# Patient Record
Sex: MALE | Race: WHITE | NOT HISPANIC OR LATINO | Employment: FULL TIME | URBAN - METROPOLITAN AREA
[De-identification: names, ages, dates, MRNs, and addresses within clinical notes are randomized per-mention and may not be internally consistent; named-entity substitution may affect disease eponyms.]

---

## 2025-01-28 ENCOUNTER — APPOINTMENT (OUTPATIENT)
Dept: RADIOLOGY | Facility: HOSPITAL | Age: 60
End: 2025-01-28
Payer: COMMERCIAL

## 2025-01-28 ENCOUNTER — APPOINTMENT (OUTPATIENT)
Dept: CARDIOLOGY | Facility: HOSPITAL | Age: 60
End: 2025-01-28
Payer: COMMERCIAL

## 2025-01-28 ENCOUNTER — HOSPITAL ENCOUNTER (EMERGENCY)
Facility: HOSPITAL | Age: 60
Discharge: HOME | End: 2025-01-28
Payer: COMMERCIAL

## 2025-01-28 ENCOUNTER — OFFICE VISIT (OUTPATIENT)
Dept: URGENT CARE | Age: 60
End: 2025-01-28
Payer: COMMERCIAL

## 2025-01-28 VITALS
WEIGHT: 189.38 LBS | TEMPERATURE: 99 F | HEART RATE: 98 BPM | DIASTOLIC BLOOD PRESSURE: 101 MMHG | BODY MASS INDEX: 29.72 KG/M2 | SYSTOLIC BLOOD PRESSURE: 180 MMHG | OXYGEN SATURATION: 93 % | RESPIRATION RATE: 20 BRPM | HEIGHT: 67 IN

## 2025-01-28 VITALS
BODY MASS INDEX: 28.88 KG/M2 | SYSTOLIC BLOOD PRESSURE: 173 MMHG | OXYGEN SATURATION: 98 % | HEIGHT: 67 IN | WEIGHT: 184 LBS | RESPIRATION RATE: 20 BRPM | DIASTOLIC BLOOD PRESSURE: 97 MMHG | HEART RATE: 82 BPM | TEMPERATURE: 98.8 F

## 2025-01-28 DIAGNOSIS — R10.84 GENERALIZED ABDOMINAL PAIN: Primary | ICD-10-CM

## 2025-01-28 DIAGNOSIS — R10.32 LEFT GROIN PAIN: Primary | ICD-10-CM

## 2025-01-28 LAB
ALBUMIN SERPL BCP-MCNC: 4.8 G/DL (ref 3.4–5)
ALP SERPL-CCNC: 58 U/L (ref 33–120)
ALT SERPL W P-5'-P-CCNC: 26 U/L (ref 10–52)
ANION GAP SERPL CALCULATED.3IONS-SCNC: 13 MMOL/L (ref 10–20)
APPEARANCE UR: CLEAR
AST SERPL W P-5'-P-CCNC: 20 U/L (ref 9–39)
ATRIAL RATE: 117 BPM
BASOPHILS # BLD AUTO: 0.05 X10*3/UL (ref 0–0.1)
BASOPHILS NFR BLD AUTO: 0.5 %
BILIRUB SERPL-MCNC: 0.6 MG/DL (ref 0–1.2)
BILIRUB UR STRIP.AUTO-MCNC: NEGATIVE MG/DL
BUN SERPL-MCNC: 18 MG/DL (ref 6–23)
CALCIUM SERPL-MCNC: 10.2 MG/DL (ref 8.6–10.3)
CARDIAC TROPONIN I PNL SERPL HS: 10 NG/L (ref 0–20)
CARDIAC TROPONIN I PNL SERPL HS: 8 NG/L (ref 0–20)
CHLORIDE SERPL-SCNC: 98 MMOL/L (ref 98–107)
CO2 SERPL-SCNC: 28 MMOL/L (ref 21–32)
COLOR UR: ABNORMAL
CREAT SERPL-MCNC: 0.88 MG/DL (ref 0.5–1.3)
EGFRCR SERPLBLD CKD-EPI 2021: >90 ML/MIN/1.73M*2
EOSINOPHIL # BLD AUTO: 0.13 X10*3/UL (ref 0–0.7)
EOSINOPHIL NFR BLD AUTO: 1.4 %
ERYTHROCYTE [DISTWIDTH] IN BLOOD BY AUTOMATED COUNT: 12.8 % (ref 11.5–14.5)
GLUCOSE SERPL-MCNC: 248 MG/DL (ref 74–99)
GLUCOSE UR STRIP.AUTO-MCNC: ABNORMAL MG/DL
HCT VFR BLD AUTO: 45 % (ref 41–52)
HGB BLD-MCNC: 15.5 G/DL (ref 13.5–17.5)
IMM GRANULOCYTES # BLD AUTO: 0.02 X10*3/UL (ref 0–0.7)
IMM GRANULOCYTES NFR BLD AUTO: 0.2 % (ref 0–0.9)
KETONES UR STRIP.AUTO-MCNC: ABNORMAL MG/DL
LACTATE SERPL-SCNC: 1 MMOL/L (ref 0.4–2)
LEUKOCYTE ESTERASE UR QL STRIP.AUTO: NEGATIVE
LIPASE SERPL-CCNC: 17 U/L (ref 9–82)
LYMPHOCYTES # BLD AUTO: 1.91 X10*3/UL (ref 1.2–4.8)
LYMPHOCYTES NFR BLD AUTO: 20.9 %
MAGNESIUM SERPL-MCNC: 2.11 MG/DL (ref 1.6–2.4)
MCH RBC QN AUTO: 32.2 PG (ref 26–34)
MCHC RBC AUTO-ENTMCNC: 34.4 G/DL (ref 32–36)
MCV RBC AUTO: 94 FL (ref 80–100)
MONOCYTES # BLD AUTO: 0.57 X10*3/UL (ref 0.1–1)
MONOCYTES NFR BLD AUTO: 6.3 %
NEUTROPHILS # BLD AUTO: 6.44 X10*3/UL (ref 1.2–7.7)
NEUTROPHILS NFR BLD AUTO: 70.7 %
NITRITE UR QL STRIP.AUTO: NEGATIVE
NRBC BLD-RTO: 0 /100 WBCS (ref 0–0)
P AXIS: 58 DEGREES
P OFFSET: 184 MS
P ONSET: 135 MS
PH UR STRIP.AUTO: 7 [PH]
PLATELET # BLD AUTO: 244 X10*3/UL (ref 150–450)
POTASSIUM SERPL-SCNC: 4.5 MMOL/L (ref 3.5–5.3)
PR INTERVAL: 156 MS
PROT SERPL-MCNC: 7.8 G/DL (ref 6.4–8.2)
PROT UR STRIP.AUTO-MCNC: ABNORMAL MG/DL
Q ONSET: 213 MS
QRS COUNT: 19 BEATS
QRS DURATION: 92 MS
QT INTERVAL: 318 MS
QTC CALCULATION(BAZETT): 443 MS
QTC FREDERICIA: 397 MS
R AXIS: 31 DEGREES
RBC # BLD AUTO: 4.81 X10*6/UL (ref 4.5–5.9)
RBC # UR STRIP.AUTO: NEGATIVE /UL
RBC #/AREA URNS AUTO: NORMAL /HPF
SODIUM SERPL-SCNC: 134 MMOL/L (ref 136–145)
SP GR UR STRIP.AUTO: 1.03
T AXIS: 35 DEGREES
T OFFSET: 372 MS
UROBILINOGEN UR STRIP.AUTO-MCNC: NORMAL MG/DL
VENTRICULAR RATE: 117 BPM
WBC # BLD AUTO: 9.1 X10*3/UL (ref 4.4–11.3)
WBC #/AREA URNS AUTO: NORMAL /HPF

## 2025-01-28 PROCEDURE — 74177 CT ABD & PELVIS W/CONTRAST: CPT

## 2025-01-28 PROCEDURE — 85025 COMPLETE CBC W/AUTO DIFF WBC: CPT | Performed by: PHYSICIAN ASSISTANT

## 2025-01-28 PROCEDURE — 71045 X-RAY EXAM CHEST 1 VIEW: CPT

## 2025-01-28 PROCEDURE — 80053 COMPREHEN METABOLIC PANEL: CPT | Performed by: PHYSICIAN ASSISTANT

## 2025-01-28 PROCEDURE — 36415 COLL VENOUS BLD VENIPUNCTURE: CPT | Performed by: PHYSICIAN ASSISTANT

## 2025-01-28 PROCEDURE — 83690 ASSAY OF LIPASE: CPT | Performed by: PHYSICIAN ASSISTANT

## 2025-01-28 PROCEDURE — 84484 ASSAY OF TROPONIN QUANT: CPT | Performed by: PHYSICIAN ASSISTANT

## 2025-01-28 PROCEDURE — 99285 EMERGENCY DEPT VISIT HI MDM: CPT | Mod: 25

## 2025-01-28 PROCEDURE — 96374 THER/PROPH/DIAG INJ IV PUSH: CPT

## 2025-01-28 PROCEDURE — 83735 ASSAY OF MAGNESIUM: CPT | Performed by: PHYSICIAN ASSISTANT

## 2025-01-28 PROCEDURE — 76870 US EXAM SCROTUM: CPT | Mod: FOREIGN READ | Performed by: RADIOLOGY

## 2025-01-28 PROCEDURE — 93975 VASCULAR STUDY: CPT

## 2025-01-28 PROCEDURE — 96376 TX/PRO/DX INJ SAME DRUG ADON: CPT

## 2025-01-28 PROCEDURE — 93971 EXTREMITY STUDY: CPT

## 2025-01-28 PROCEDURE — 96361 HYDRATE IV INFUSION ADD-ON: CPT

## 2025-01-28 PROCEDURE — 71045 X-RAY EXAM CHEST 1 VIEW: CPT | Mod: FOREIGN READ | Performed by: RADIOLOGY

## 2025-01-28 PROCEDURE — 2550000001 HC RX 255 CONTRASTS: Performed by: PHYSICIAN ASSISTANT

## 2025-01-28 PROCEDURE — 93971 EXTREMITY STUDY: CPT | Mod: FOREIGN READ | Performed by: RADIOLOGY

## 2025-01-28 PROCEDURE — 96372 THER/PROPH/DIAG INJ SC/IM: CPT | Performed by: PHYSICIAN ASSISTANT

## 2025-01-28 PROCEDURE — 93976 VASCULAR STUDY: CPT | Mod: FOREIGN READ | Performed by: RADIOLOGY

## 2025-01-28 PROCEDURE — 93005 ELECTROCARDIOGRAM TRACING: CPT

## 2025-01-28 PROCEDURE — 83605 ASSAY OF LACTIC ACID: CPT | Performed by: PHYSICIAN ASSISTANT

## 2025-01-28 PROCEDURE — 96375 TX/PRO/DX INJ NEW DRUG ADDON: CPT

## 2025-01-28 PROCEDURE — 81001 URINALYSIS AUTO W/SCOPE: CPT | Performed by: PHYSICIAN ASSISTANT

## 2025-01-28 PROCEDURE — 74177 CT ABD & PELVIS W/CONTRAST: CPT | Mod: FOREIGN READ | Performed by: RADIOLOGY

## 2025-01-28 PROCEDURE — 2500000004 HC RX 250 GENERAL PHARMACY W/ HCPCS (ALT 636 FOR OP/ED): Performed by: PHYSICIAN ASSISTANT

## 2025-01-28 RX ORDER — MORPHINE SULFATE 4 MG/ML
4 INJECTION, SOLUTION INTRAMUSCULAR; INTRAVENOUS ONCE
Status: COMPLETED | OUTPATIENT
Start: 2025-01-28 | End: 2025-01-28

## 2025-01-28 RX ORDER — CYANOCOBALAMIN (VITAMIN B-12) 250 MCG
250 TABLET ORAL DAILY
COMMUNITY

## 2025-01-28 RX ORDER — ORPHENADRINE CITRATE 30 MG/ML
60 INJECTION INTRAMUSCULAR; INTRAVENOUS ONCE
Status: COMPLETED | OUTPATIENT
Start: 2025-01-28 | End: 2025-01-28

## 2025-01-28 RX ORDER — KETOROLAC TROMETHAMINE 30 MG/ML
15 INJECTION, SOLUTION INTRAMUSCULAR; INTRAVENOUS ONCE
Status: COMPLETED | OUTPATIENT
Start: 2025-01-28 | End: 2025-01-28

## 2025-01-28 RX ADMIN — SODIUM CHLORIDE 1000 ML: 900 INJECTION, SOLUTION INTRAVENOUS at 12:13

## 2025-01-28 RX ADMIN — ORPHENADRINE CITRATE 60 MG: 60 INJECTION INTRAMUSCULAR; INTRAVENOUS at 16:18

## 2025-01-28 RX ADMIN — MORPHINE SULFATE 4 MG: 4 INJECTION, SOLUTION INTRAMUSCULAR; INTRAVENOUS at 12:12

## 2025-01-28 RX ADMIN — KETOROLAC TROMETHAMINE 15 MG: 30 INJECTION, SOLUTION INTRAMUSCULAR at 12:12

## 2025-01-28 RX ADMIN — IOHEXOL 75 ML: 350 INJECTION, SOLUTION INTRAVENOUS at 13:21

## 2025-01-28 RX ADMIN — SODIUM CHLORIDE 1000 ML: 900 INJECTION, SOLUTION INTRAVENOUS at 12:12

## 2025-01-28 RX ADMIN — MORPHINE SULFATE 4 MG: 4 INJECTION, SOLUTION INTRAMUSCULAR; INTRAVENOUS at 16:18

## 2025-01-28 ASSESSMENT — PAIN SCALES - GENERAL
PAINLEVEL_OUTOF10: 8
PAINLEVEL_OUTOF10: 7
PAINLEVEL_OUTOF10: 7
PAINLEVEL_OUTOF10: 8
PAINLEVEL_OUTOF10: 2

## 2025-01-28 ASSESSMENT — COLUMBIA-SUICIDE SEVERITY RATING SCALE - C-SSRS
1. IN THE PAST MONTH, HAVE YOU WISHED YOU WERE DEAD OR WISHED YOU COULD GO TO SLEEP AND NOT WAKE UP?: NO
2. HAVE YOU ACTUALLY HAD ANY THOUGHTS OF KILLING YOURSELF?: NO
6. HAVE YOU EVER DONE ANYTHING, STARTED TO DO ANYTHING, OR PREPARED TO DO ANYTHING TO END YOUR LIFE?: NO

## 2025-01-28 ASSESSMENT — PAIN - FUNCTIONAL ASSESSMENT: PAIN_FUNCTIONAL_ASSESSMENT: 0-10

## 2025-01-28 ASSESSMENT — PAIN DESCRIPTION - LOCATION: LOCATION: GROIN

## 2025-01-28 NOTE — PROGRESS NOTES
"Subjective   Patient ID: Js Kenny Sr. is a 60 y.o. male. They present today with a chief complaint of Groin Pain (Pain of the L Groin area x 1 d; Denies injury.).    History of Present Illness    Groin Pain    This is a very pleasant 60-year-old gentleman presents today complaining of sudden onset of left groin pain 12 hours ago that woke him up from sleep.  Patient states the pain has been persistent.  He denies any numbness or tingling to his lower extremities.  Denies any trauma.  He denies any heavy lifting.  Patient states the pain is fourth with ambulation.  Past Medical History  Allergies as of 01/28/2025 - Reviewed 01/28/2025   Allergen Reaction Noted    Penicillins Hives 09/18/2012       (Not in a hospital admission)       Past Medical History:   Diagnosis Date    Personal history of other diseases of the musculoskeletal system and connective tissue     History of arthritis    Personal history of other diseases of the musculoskeletal system and connective tissue     History of low back pain    Personal history of other endocrine, nutritional and metabolic disease     History of type 2 diabetes mellitus       History reviewed. No pertinent surgical history.     reports that he has been smoking cigarettes. He does not have any smokeless tobacco history on file. He reports current alcohol use.    Review of Systems  Review of Systems   All other systems reviewed and are negative.                                 Objective    Vitals:    01/28/25 1037   BP: (!) 173/97   BP Location: Left arm   Patient Position: Sitting   BP Cuff Size: Large adult   Pulse: 82   Resp: 20   Temp: 37.1 °C (98.8 °F)   TempSrc: Oral   SpO2: 98%   Weight: 83.5 kg (184 lb)   Height: 1.702 m (5' 7\")     No LMP for male patient.    Physical Exam  The patient is awake alert oriented x 3 in no acute distress.  Vital signs are stable.  Evaluation the left inguinal region reveals a pulses to be intact.  There was pinpoint tenderness to " the mid left inguinal region.  There was no bony tenderness.  Positive pain on palpation.  No ecchymosis or bruising noted.  Pain with straight leg raising.  Distal pulses are intact.  Procedures    Point of Care Test & Imaging Results from this visit  No results found for this visit on 01/28/25.   No results found.    Diagnostic study results (if any) were reviewed by Gamal Bañuelos DO.    Assessment/Plan   Allergies, medications, history, and pertinent labs/EKGs/Imaging reviewed by Gamal Bañuelos DO.     Medical Decision Making  The patient was advised to be seen in the emergency room to undergo ultrasound of his left groin region to rule out any vascular pathology.    Orders and Diagnoses  Diagnoses and all orders for this visit:  Left groin pain      Medical Admin Record      Patient disposition: ED    Electronically signed by Gamal Bañuelos DO  10:50 AM

## 2025-01-28 NOTE — ED PROVIDER NOTES
"Patient signed out to me by off going provider, Dr. Celio Helton, at 6:06 PM in stable condition.    IN BRIEF:  60 y.o.male // pmhx nothing pertinent// p/w left groin pain  -Workup unremarkable up to this point  -Imaging unremarkable for any significant pathology    BP (!) 174/97   Pulse 99   Temp 37.2 °C (99 °F)   Resp 18   Ht 1.702 m (5' 7\")   Wt 85.9 kg (189 lb 6 oz)   SpO2 96%   BMI 29.66 kg/m²     ED Course as of 01/28/25 1806 Tue Jan 28, 2025   1207 EKG interpreted by myself independently, EKG shows a sinus tachycardia, rate of 117 bpm, TX interval 156, QRS 92, , QTc 443, patient has no ST elevation or depression, negative for acute MI. [OMAR]      ED Course User Index  [OMAR] Celio Helton, DO         Diagnoses as of 01/28/25 1806   Generalized abdominal pain       Remaining work up:  Images US scrotum, Reassessment, and Discharge pending imaging    Likely disposition Discharge Home with alternative Urology consult for possible surgical intervention and admission.    Physical Exam  Vitals and nursing note reviewed.   Constitutional:       Appearance: Normal appearance. He is normal weight.   HENT:      Mouth/Throat:      Mouth: Mucous membranes are moist.   Eyes:      Pupils: Pupils are equal, round, and reactive to light.   Cardiovascular:      Rate and Rhythm: Normal rate and regular rhythm.      Pulses: Normal pulses.   Pulmonary:      Effort: Pulmonary effort is normal.      Breath sounds: Normal breath sounds.   Skin:     General: Skin is warm and dry.   Neurological:      General: No focal deficit present.      Mental Status: He is alert and oriented to person, place, and time.         TRANSITION of CARE INTERVAL:  Reviewed clinical workup without and concern for testicular torsion or epididymitis/orchitis.  Discussed clinical findings with patient.  On reassessment patient endorses minimal discomfort in left groin without any evidence of bulging concerning for indirect incarcerated/strangulated " hernia.  Provided patient appropriate follow-up instructions and RTED precautions.  Patient is appropriate for discharge home at this time and agreeable to plan of care.       FINAL IMPRESSION:  1. Generalized abdominal pain              FINAL DISPOSITION:  Discharge home       Jared Melvin MD  01/29/25 9268

## 2025-01-28 NOTE — ED PROVIDER NOTES
HPI   Chief Complaint   Patient presents with    Groin Pain     Pt states he was woken from his sleep with sharp left groin pain. Gets worse with movement.       HPI  Patient is a 60-year-old male here for evaluation of left groin pain.  Says that he woke up from sleep with sharp left groin pain, seems be worse with movement.  The patient denies any major health issues or medical problems, drinks and smokes daily.  But does not follow with a physician regularly.  Patient is having no chest pain, no shortness of breath.  And complaining of pain predominantly left groin.  No trouble urinating.      Patient History   Past Medical History:   Diagnosis Date    Personal history of other diseases of the musculoskeletal system and connective tissue     History of arthritis    Personal history of other diseases of the musculoskeletal system and connective tissue     History of low back pain    Personal history of other endocrine, nutritional and metabolic disease     History of type 2 diabetes mellitus     History reviewed. No pertinent surgical history.  No family history on file.  Social History     Tobacco Use    Smoking status: Every Day     Types: Cigarettes    Smokeless tobacco: Not on file   Substance Use Topics    Alcohol use: Yes    Drug use: Not on file       Physical Exam   ED Triage Vitals [01/28/25 1115]   Temperature Heart Rate Respirations BP   37.2 °C (99 °F) (!) 118 16 (!) 197/122      Pulse Ox Temp src Heart Rate Source Patient Position   97 % -- -- --      BP Location FiO2 (%)     -- --       Physical Exam      ED Course & [facility]   ED Course as of 01/28/25 1721 Tue Jan 28, 2025   1207 EKG interpreted by myself independently, EKG shows a sinus tachycardia, rate of 117 bpm, ID interval 156, QRS 92, , QTc 443, patient has no ST elevation or depression, negative for acute MI. [OMAR]      ED Course User Index  [OMAR] Celio Helton,          Diagnoses as of 01/28/25 1721   Generalized abdominal pain                  No data recorded     Avery Coma Scale Score: 15 (01/28/25 1125 : Maribel Banegas RN)                           Medical Decision Making  Parts of this chart have been completed using voice recognition software. Please excuse any errors of transcription.  My thought process and reason for plan has been formulated from the time that I saw the patient until the time of disposition and is not specific to one specific moment during their visit and furthermore my MDM encompasses this entire chart and not only this text box.      HPI: Detailed above.    Exam: A medically appropriate exam performed, outlined above, given the known history and presentation.    History Limited by: Nothing    History obtained from: The patient    External/internal records reviewed: No external record reviewed    Social Determinants of Health considered during this visit: Lives at    Chronic conditions impacting care: Denies    Medications given during visit:  Medications   sodium chloride 0.9 % bolus 1,000 mL (0 mL intravenous Stopped 1/28/25 1314)   ketorolac (Toradol) injection 15 mg (15 mg intravenous Given 1/28/25 1212)   morphine injection 4 mg (4 mg intravenous Given 1/28/25 1212)   sodium chloride 0.9 % bolus 1,000 mL (0 mL intravenous Stopped 1/28/25 1341)   iohexol (OMNIPaque) 350 mg iodine/mL solution 75 mL (75 mL intravenous Given 1/28/25 1321)   orphenadrine (Norflex) injection 60 mg (60 mg intramuscular Given 1/28/25 1618)   morphine injection 4 mg (4 mg intravenous Given 1/28/25 1618)        Diagnostic/tests  Labs Reviewed   COMPREHENSIVE METABOLIC PANEL - Abnormal       Result Value    Glucose 248 (*)     Sodium 134 (*)     Potassium 4.5      Chloride 98      Bicarbonate 28      Anion Gap 13      Urea Nitrogen 18      Creatinine 0.88      eGFR >90      Calcium 10.2      Albumin 4.8      Alkaline Phosphatase 58      Total Protein 7.8      AST 20      Bilirubin, Total 0.6      ALT 26     URINALYSIS WITH REFLEX CULTURE AND  MICROSCOPIC - Abnormal    Color, Urine Light-Yellow      Appearance, Urine Clear      Specific Gravity, Urine 1.027      pH, Urine 7.0      Protein, Urine 20 (TRACE)      Glucose, Urine 1000 (4+) (*)     Blood, Urine NEGATIVE      Ketones, Urine TRACE (*)     Bilirubin, Urine NEGATIVE      Urobilinogen, Urine Normal      Nitrite, Urine NEGATIVE      Leukocyte Esterase, Urine NEGATIVE     LACTATE - Normal    Lactate 1.0      Narrative:     Venipuncture immediately after or during the administration of Metamizole may lead to falsely low results. Testing should be performed immediately prior to Metamizole dosing.   LIPASE - Normal    Lipase 17      Narrative:     Venipuncture immediately after or during the administration of Metamizole may lead to falsely low results. Testing should be performed immediately prior to Metamizole dosing.   MAGNESIUM - Normal    Magnesium 2.11     SERIAL TROPONIN-INITIAL - Normal    Troponin I, High Sensitivity 8      Narrative:     Less than 99th percentile of normal range cutoff-  Female and children under 18 years old <14 ng/L; Male <21 ng/L: Negative  Repeat testing should be performed if clinically indicated.     Female and children under 18 years old 14-50 ng/L; Male 21-50 ng/L:  Consistent with possible cardiac damage and possible increased clinical   risk. Serial measurements may help to assess extent of myocardial damage.     >50 ng/L: Consistent with cardiac damage, increased clinical risk and  myocardial infarction. Serial measurements may help assess extent of   myocardial damage.      NOTE: Children less than 1 year old may have higher baseline troponin   levels and results should be interpreted in conjunction with the overall   clinical context.     NOTE: Troponin I testing is performed using a different   testing methodology at [facility] than at other   [facility]. Direct result comparisons should only   be made within the same method.   SERIAL TROPONIN,  1 HOUR - Normal    Troponin I, High Sensitivity 10      Narrative:     Less than 99th percentile of normal range cutoff-  Female and children under 18 years old <14 ng/L; Male <21 ng/L: Negative  Repeat testing should be performed if clinically indicated.     Female and children under 18 years old 14-50 ng/L; Male 21-50 ng/L:  Consistent with possible cardiac damage and possible increased clinical   risk. Serial measurements may help to assess extent of myocardial damage.     >50 ng/L: Consistent with cardiac damage, increased clinical risk and  myocardial infarction. Serial measurements may help assess extent of   myocardial damage.      NOTE: Children less than 1 year old may have higher baseline troponin   levels and results should be interpreted in conjunction with the overall   clinical context.     NOTE: Troponin I testing is performed using a different   testing methodology at [facility] than at other   [facility]. Direct result comparisons should only   be made within the same method.   URINALYSIS MICROSCOPIC WITH REFLEX CULTURE - Normal    WBC, Urine NONE      RBC, Urine 1-2     CBC WITH AUTO DIFFERENTIAL    WBC 9.1      nRBC 0.0      RBC 4.81      Hemoglobin 15.5      Hematocrit 45.0      MCV 94      MCH 32.2      MCHC 34.4      RDW 12.8      Platelets 244      Neutrophils % 70.7      Immature Granulocytes %, Automated 0.2      Lymphocytes % 20.9      Monocytes % 6.3      Eosinophils % 1.4      Basophils % 0.5      Neutrophils Absolute 6.44      Immature Granulocytes Absolute, Automated 0.02      Lymphocytes Absolute 1.91      Monocytes Absolute 0.57      Eosinophils Absolute 0.13      Basophils Absolute 0.05     TROPONIN SERIES- (INITIAL, 1 HR)    Narrative:     The following orders were created for panel order Troponin I Series, High Sensitivity (0, 1 HR).  Procedure                               Abnormality         Status                     ---------                                -----------         ------                     Troponin I, High Sensiti...[[id number]]  Normal              Final result               Troponin, High Sensitivi...[[id number]]  Normal              Final result                 Please view results for these tests on the individual orders.   URINALYSIS WITH REFLEX CULTURE AND MICROSCOPIC    Narrative:     The following orders were created for panel order Urinalysis with Reflex Culture and Microscopic.  Procedure                               Abnormality         Status                     ---------                               -----------         ------                     Urinalysis with Reflex C...[[id number]]  Abnormal            Final result               Extra Urine Gray Tube[[id number]]                                                         Please view results for these tests on the individual orders.   EXTRA URINE GRAY TUBE      CT abdomen pelvis w IV contrast   Final Result   1. Question minimal wall thickening of the colon versus   underdistention. Correlate clinically for possible mild colitis.   2. Moderate coronary artery calcifications.   3. Hepatic steatosis.   4. Enlarged prostate.   Signed by Vlad Rocha MD      Lower extremity venous duplex left   Final Result   No evidence for DVT within the left lower extremity.   Signed by Levy Luu MD      XR chest 1 view   Final Result   No acute cardiopulmonary disease.   Signed by Gonsalo Aragon,       US scrotum w doppler    (Results Pending)            Considerations/further MDM:  I estimate there is low risk for acute abdomen.  I have considered the following: acute appendicitis, bowel obstruction, cholecystitis, diverticulitis, incarcerated hernia, mesenteric ischemia, pancreatitis, acute liver failure, renal failure, UTI/Pyelonephritis to an extent that requires admission and IV abx, perforated bowel, or ulcers.      Seen in conjunction with attending physician Dr. Helton    5:21 PM -I am of my  departure from shift (radha) please refer to attending physician's note for details of diagnosis, differential diagnosis, review of outstanding diagnostic studies, patient reevaluation, patient education, and complete treatment plan from this point forward  .      Procedure  Procedures     Farrukh Hanks PA-C  01/28/25 1102

## 2025-01-28 NOTE — Clinical Note
Js Kenny was seen and treated in our emergency department on 1/28/2025.  He may return to work on 01/30/2025.       If you have any questions or concerns, please don't hesitate to call.      Jared Melvin MD

## 2025-01-28 NOTE — PROGRESS NOTES
Pharmacy Medication History Review    Js Kenny Sr. is a 60 y.o. male admitted for Groin Pain. Pharmacy reviewed the patient's [id number] medications and allergies for accuracy.    Medications ADDED:  Vitamin b12  Medications CHANGED:  N/A  Medications REMOVED:   N/A     The list below reflects the updated PTA list.   Prior to Admission Medications   Prescriptions Last Dose Informant Patient Reported? Taking?   cyanocobalamin (Vitamin B-12) 250 mcg tablet 1/28/2025 Morning  Yes Yes   Sig: Take 1 tablet (250 mcg) by mouth once daily.      Facility-Administered Medications: None        The list below reflects the updated allergy list. Please review each documented allergy for additional clarification and justification.  Allergies  Reviewed by Nan Glynn CPhT on 1/28/2025        Severity Reactions Comments    Penicillins Medium Hives             Patient declines M2B at discharge.     Sources:   Pharmacy dispense history  Patient interview Good historian     Additional Comments:  Pt states he is not taking any prescription medications at this time      NAN GLYNN CPhT  Pharmacy Technician  01/28/25     Secure Chat preferred

## 2025-01-28 NOTE — ED PROVIDER NOTES
THIS IS MY NILA SUPERVISORY AND SHARED VISIT NOTE:    I personally saw the patient and made/approved the management plan and take responsibility for the patient management.    History: Patient patient is a 60-year-old male presents today with a chief complaint of left groin pain, patient states he woke from sleep with sharp left groin pain, it is worsened by movement, patient denies any dysuria, [place] any chest pain shortness of breath, denies any nausea or vomiting, states is really his left groin    Exam: GENERAL APPEARANCE: Awake and alert.     HEENT: Normocephalic, atraumatic. Extraocular muscles are intact. Pupils equal round and reactive to light.  CHEST: Nontender to palpation. Clear to auscultation bilaterally. No rales, rhonchi, or wheezing.   HEART: S1, S2. Regular rate and rhythm. No murmurs, gallops or rubs.  Strong and equal pulses in the extremities.   ABDOMEN: Soft,.  non-tender.  No rebound or guarding, bowel sounds normal x 4 quadrants  NEUROLOGICAL: Awake, alert and oriented x 3.       MDM: Patient seen and evaluated at bedside, patient is in no acute distress.  I will order a CBC, CMP, urinalysis, troponin, magnesium, lipase, scrotum, CT abdomen pelvis, lower extremity ultrasound, x-ray of chest. Differential diagnosis includes but is not limited to testicular torsion, varicocele, hydrocele, epididymal cyst  Patient's urinalysis shows glucose and ketones, no signs of infection, troponin 8 and 10, lipase 17, sodium 134, lactate 1.0, CBC shows no abnormalities, imaging listed below, patient's ultrasound scrotum shows right epididymal cyst and calcification and a right varicocele as well as left inguinal lymph node, patiently given referral to urology, discharged home    Diagnosis: Groin pain, generalized abdominal pain  US scrotum w doppler   Final Result   Right epididymal cyst and calcification. Right varicocele.   Left inguinal lymph node.   Normal testicular spectral Doppler evaluation.   Signed  by Levy Luu MD      CT abdomen pelvis w IV contrast   Final Result   1. Question minimal wall thickening of the colon versus   underdistention. Correlate clinically for possible mild colitis.   2. Moderate coronary artery calcifications.   3. Hepatic steatosis.   4. Enlarged prostate.   Signed by Vlad Rocha MD      Lower extremity venous duplex left   Final Result   No evidence for DVT within the left lower extremity.   Signed by Levy Luu MD      XR chest 1 view   Final Result   No acute cardiopulmonary disease.   Signed by Gonsalo Aragon DO        Results for orders placed or performed during the hospital encounter of 01/28/25   Lactate    Collection Time: 01/28/25 12:02 PM   Result Value Ref Range    Lactate 1.0 0.4 - 2.0 mmol/L   CBC and Auto Differential    Collection Time: 01/28/25 12:02 PM   Result Value Ref Range    WBC 9.1 4.4 - 11.3 x10*3/uL    nRBC 0.0 0.0 - 0.0 /100 WBCs    RBC 4.81 4.50 - 5.90 x10*6/uL    Hemoglobin 15.5 13.5 - 17.5 g/dL    Hematocrit 45.0 41.0 - 52.0 %    MCV 94 80 - 100 fL    MCH 32.2 26.0 - 34.0 pg    MCHC 34.4 32.0 - 36.0 g/dL    RDW 12.8 11.5 - 14.5 %    Platelets 244 150 - 450 x10*3/uL    Neutrophils % 70.7 40.0 - 80.0 %    Immature Granulocytes %, Automated 0.2 0.0 - 0.9 %    Lymphocytes % 20.9 13.0 - 44.0 %    Monocytes % 6.3 2.0 - 10.0 %    Eosinophils % 1.4 0.0 - 6.0 %    Basophils % 0.5 0.0 - 2.0 %    Neutrophils Absolute 6.44 1.20 - 7.70 x10*3/uL    Immature Granulocytes Absolute, Automated 0.02 0.00 - 0.70 x10*3/uL    Lymphocytes Absolute 1.91 1.20 - 4.80 x10*3/uL    Monocytes Absolute 0.57 0.10 - 1.00 x10*3/uL    Eosinophils Absolute 0.13 0.00 - 0.70 x10*3/uL    Basophils Absolute 0.05 0.00 - 0.10 x10*3/uL   Comprehensive Metabolic Panel    Collection Time: 01/28/25 12:02 PM   Result Value Ref Range    Glucose 248 (H) 74 - 99 mg/dL    Sodium 134 (L) 136 - 145 mmol/L    Potassium 4.5 3.5 - 5.3 mmol/L    Chloride 98 98 - 107 mmol/L    Bicarbonate 28 21 - 32  mmol/L    Anion Gap 13 10 - 20 mmol/L    Urea Nitrogen 18 6 - 23 mg/dL    Creatinine 0.88 0.50 - 1.30 mg/dL    eGFR >90 >60 mL/min/1.73m*2    Calcium 10.2 8.6 - 10.3 mg/dL    Albumin 4.8 3.4 - 5.0 g/dL    Alkaline Phosphatase 58 33 - 120 U/L    Total Protein 7.8 6.4 - 8.2 g/dL    AST 20 9 - 39 U/L    Bilirubin, Total 0.6 0.0 - 1.2 mg/dL    ALT 26 10 - 52 U/L   Lipase    Collection Time: 01/28/25 12:02 PM   Result Value Ref Range    Lipase 17 9 - 82 U/L   Magnesium    Collection Time: 01/28/25 12:02 PM   Result Value Ref Range    Magnesium 2.11 1.60 - 2.40 mg/dL   Troponin I, High Sensitivity, Initial    Collection Time: 01/28/25 12:02 PM   Result Value Ref Range    Troponin I, High Sensitivity 8 0 - 20 ng/L   ECG 12 Lead    Collection Time: 01/28/25 12:05 PM   Result Value Ref Range    Ventricular Rate 117 BPM    Atrial Rate 117 BPM    WA Interval 156 ms    QRS Duration 92 ms    QT Interval 318 ms    QTC Calculation(Bazett) 443 ms    P Axis 58 degrees    R Axis 31 degrees    T Axis 35 degrees    QRS Count 19 beats    Q Onset 213 ms    P Onset 135 ms    P Offset 184 ms    T Offset 372 ms    QTC Fredericia 397 ms   Urinalysis with Reflex Culture and Microscopic    Collection Time: 01/28/25  1:44 PM   Result Value Ref Range    Color, Urine Light-Yellow Light-Yellow, Yellow, Dark-Yellow    Appearance, Urine Clear Clear    Specific Gravity, Urine 1.027 1.005 - 1.035    pH, Urine 7.0 5.0, 5.5, 6.0, 6.5, 7.0, 7.5, 8.0    Protein, Urine 20 (TRACE) NEGATIVE, 10 (TRACE), 20 (TRACE) mg/dL    Glucose, Urine 1000 (4+) (A) Normal mg/dL    Blood, Urine NEGATIVE NEGATIVE    Ketones, Urine TRACE (A) NEGATIVE mg/dL    Bilirubin, Urine NEGATIVE NEGATIVE    Urobilinogen, Urine Normal Normal mg/dL    Nitrite, Urine NEGATIVE NEGATIVE    Leukocyte Esterase, Urine NEGATIVE NEGATIVE   Troponin, High Sensitivity, 1 Hour    Collection Time: 01/28/25  1:44 PM   Result Value Ref Range    Troponin I, High Sensitivity 10 0 - 20 ng/L   Urinalysis  Microscopic    Collection Time: 01/28/25  1:44 PM   Result Value Ref Range    WBC, Urine NONE 1-5, NONE /HPF    RBC, Urine 1-2 NONE, 1-2, 3-5 /HPF     .    Please see NILA note for further details    Sections of this report were created using voice-to-text technology and may contain errors in translation    Celio Helton DO  Emergency Medicine       Celio Helton DO  01/29/25 0913

## 2025-04-11 ENCOUNTER — APPOINTMENT (OUTPATIENT)
Dept: PRIMARY CARE | Facility: CLINIC | Age: 60
End: 2025-04-11
Payer: COMMERCIAL

## 2025-04-11 VITALS
DIASTOLIC BLOOD PRESSURE: 80 MMHG | HEART RATE: 101 BPM | SYSTOLIC BLOOD PRESSURE: 140 MMHG | WEIGHT: 186 LBS | BODY MASS INDEX: 29.19 KG/M2 | HEIGHT: 67 IN | TEMPERATURE: 98.1 F | OXYGEN SATURATION: 96 %

## 2025-04-11 DIAGNOSIS — R10.84 GENERALIZED ABDOMINAL PAIN: ICD-10-CM

## 2025-04-11 DIAGNOSIS — E11.65 TYPE 2 DIABETES MELLITUS WITH HYPERGLYCEMIA, WITHOUT LONG-TERM CURRENT USE OF INSULIN: Primary | ICD-10-CM

## 2025-04-11 DIAGNOSIS — M45.9 ANKYLOSING SPONDYLITIS, UNSPECIFIED SITE OF SPINE (MULTI): ICD-10-CM

## 2025-04-11 DIAGNOSIS — Z12.5 SCREENING FOR PROSTATE CANCER: ICD-10-CM

## 2025-04-11 PROCEDURE — 3079F DIAST BP 80-89 MM HG: CPT | Performed by: STUDENT IN AN ORGANIZED HEALTH CARE EDUCATION/TRAINING PROGRAM

## 2025-04-11 PROCEDURE — 99204 OFFICE O/P NEW MOD 45 MIN: CPT | Performed by: STUDENT IN AN ORGANIZED HEALTH CARE EDUCATION/TRAINING PROGRAM

## 2025-04-11 PROCEDURE — 3077F SYST BP >= 140 MM HG: CPT | Performed by: STUDENT IN AN ORGANIZED HEALTH CARE EDUCATION/TRAINING PROGRAM

## 2025-04-11 PROCEDURE — 3008F BODY MASS INDEX DOCD: CPT | Performed by: STUDENT IN AN ORGANIZED HEALTH CARE EDUCATION/TRAINING PROGRAM

## 2025-04-11 ASSESSMENT — PAIN SCALES - GENERAL: PAINLEVEL_OUTOF10: 4

## 2025-04-11 ASSESSMENT — PATIENT HEALTH QUESTIONNAIRE - PHQ9
2. FEELING DOWN, DEPRESSED OR HOPELESS: NOT AT ALL
SUM OF ALL RESPONSES TO PHQ9 QUESTIONS 1 AND 2: 0
1. LITTLE INTEREST OR PLEASURE IN DOING THINGS: NOT AT ALL

## 2025-04-11 NOTE — PROGRESS NOTES
"Subjective   Patient ID: Js Kenny Sr. is a 60 y.o. male who presents for Groin Pain (Pt has been experiencing left groin pain for 3 months.).    Appointment initially made for groin pain.  However, patient reports this has resolved and he would like to discuss some other health problems.      Patient does have diagnosis of T2DM.  Only medication tried in the past was metformin, which led to severe GI side effects.  Most recent primary physician did not prescribe any meds for diabetes or ever order A1C.     Patient also with known HTN.  Has not had cancer screenings.  Does smoke tobacco.  Open to addressing all issues in near future.         Review of Systems   Constitutional:  Negative for chills, fatigue and fever.   HENT:  Negative for congestion, hearing loss, rhinorrhea, sinus pressure, sinus pain and tinnitus.    Eyes:  Negative for visual disturbance.   Respiratory:  Negative for cough, shortness of breath and wheezing.    Cardiovascular:  Negative for chest pain, palpitations and leg swelling.   Gastrointestinal:  Negative for constipation, diarrhea, nausea and vomiting.   Endocrine: Negative for cold intolerance, heat intolerance, polydipsia and polyuria.   Genitourinary:  Positive for frequency. Negative for dysuria and urgency.   Musculoskeletal:  Negative for arthralgias and myalgias.   Skin:  Negative for pallor, rash and wound.   Neurological:  Negative for dizziness, light-headedness and headaches.   Psychiatric/Behavioral:  Negative for dysphoric mood and sleep disturbance. The patient is not nervous/anxious.        Objective     Visit Vitals  /80 (BP Location: Left arm, Patient Position: Sitting)   Pulse 101   Temp 36.7 °C (98.1 °F) (Temporal)   Ht 1.702 m (5' 7\")   Wt 84.4 kg (186 lb)   SpO2 96%   BMI 29.13 kg/m²   Smoking Status Every Day   BSA 2 m²         Physical Exam  Constitutional:       Appearance: Normal appearance.   HENT:      Head: Normocephalic and atraumatic.      Right " Ear: External ear normal.      Left Ear: External ear normal.   Eyes:      Conjunctiva/sclera: Conjunctivae normal.   Cardiovascular:      Rate and Rhythm: Normal rate and regular rhythm.      Pulses: Normal pulses.      Heart sounds: Normal heart sounds.   Pulmonary:      Effort: Pulmonary effort is normal.      Breath sounds: Normal breath sounds.   Skin:     General: Skin is warm and dry.   Neurological:      Mental Status: He is alert and oriented to person, place, and time.   Psychiatric:         Mood and Affect: Mood normal.         Behavior: Behavior normal.         Assessment & Plan  Generalized abdominal pain    Orders:    Referral to Primary Care    Type 2 diabetes mellitus with hyperglycemia, without long-term current use of insulin    Orders:    Hemoglobin A1c; Future    Comprehensive metabolic panel; Future    CBC; Future    Lipid panel; Future    Albumin-Creatinine Ratio, Urine Random; Future  Did not tolerate metformin - vomiting and diarrhea.  Need to start treatment.  Does not have contraindications to FPR1EYq.  These could be beneficial.  Alternatively, SGLT2i could be usefule.   Screening for prostate cancer    Orders:    PSA; Future  enlarged prostate on recent imaging with no recent PSA.   Ankylosing spondylitis, unspecified site of spine (Multi)       known prior problem, currently untreated.  Not currently having symptoms.        Patient to follow up in approximately 2 weeks given numerous unmanaged issues.  To go for blood draw prior to that.  Will start process of getting medications prescribed once results available.     Reviewed and approved by RONALD MARISCAL on 4/13/25 at 7:52 PM.

## 2025-04-13 ASSESSMENT — ENCOUNTER SYMPTOMS
VOMITING: 0
DIARRHEA: 0
ARTHRALGIAS: 0
DYSURIA: 0
DYSPHORIC MOOD: 0
PALPITATIONS: 0
NAUSEA: 0
MYALGIAS: 0
WHEEZING: 0
POLYDIPSIA: 0
RHINORRHEA: 0
FATIGUE: 0
CONSTIPATION: 0
DIZZINESS: 0
FREQUENCY: 1
HEADACHES: 0
SLEEP DISTURBANCE: 0
NERVOUS/ANXIOUS: 0
SINUS PAIN: 0
CHILLS: 0
WOUND: 0
FEVER: 0
LIGHT-HEADEDNESS: 0
COUGH: 0
SHORTNESS OF BREATH: 0
SINUS PRESSURE: 0

## 2025-04-24 LAB
ALBUMIN SERPL-MCNC: 4.8 G/DL (ref 3.6–5.1)
ALBUMIN/CREAT UR: 132 MG/G CREAT
ALP SERPL-CCNC: 65 U/L (ref 35–144)
ALT SERPL-CCNC: 28 U/L (ref 9–46)
ANION GAP SERPL CALCULATED.4IONS-SCNC: 12 MMOL/L (CALC) (ref 7–17)
AST SERPL-CCNC: 24 U/L (ref 10–35)
BILIRUB SERPL-MCNC: 0.5 MG/DL (ref 0.2–1.2)
BUN SERPL-MCNC: 11 MG/DL (ref 7–25)
CALCIUM SERPL-MCNC: 10 MG/DL (ref 8.6–10.3)
CHLORIDE SERPL-SCNC: 99 MMOL/L (ref 98–110)
CHOLEST SERPL-MCNC: 277 MG/DL
CHOLEST/HDLC SERPL: 4.3 (CALC)
CO2 SERPL-SCNC: 25 MMOL/L (ref 20–32)
CREAT SERPL-MCNC: 0.81 MG/DL (ref 0.7–1.3)
CREAT UR-MCNC: 68 MG/DL (ref 20–320)
EGFRCR SERPLBLD CKD-EPI 2021: 102 ML/MIN/1.73M2
ERYTHROCYTE [DISTWIDTH] IN BLOOD BY AUTOMATED COUNT: 11.2 % (ref 11–15)
EST. AVERAGE GLUCOSE BLD GHB EST-MCNC: 237 MG/DL
EST. AVERAGE GLUCOSE BLD GHB EST-SCNC: 13.2 MMOL/L
GLUCOSE SERPL-MCNC: 228 MG/DL (ref 65–99)
HBA1C MFR BLD: 9.9 %
HCT VFR BLD AUTO: 49.8 % (ref 38.5–50)
HDLC SERPL-MCNC: 65 MG/DL
HGB BLD-MCNC: 16.9 G/DL (ref 13.2–17.1)
LDLC SERPL CALC-MCNC: 171 MG/DL (CALC)
MCH RBC QN AUTO: 33.1 PG (ref 27–33)
MCHC RBC AUTO-ENTMCNC: 33.9 G/DL (ref 32–36)
MCV RBC AUTO: 97.5 FL (ref 80–100)
MICROALBUMIN UR-MCNC: 9 MG/DL
NONHDLC SERPL-MCNC: 212 MG/DL (CALC)
PLATELET # BLD AUTO: 251 THOUSAND/UL (ref 140–400)
PMV BLD REES-ECKER: 9.1 FL (ref 7.5–12.5)
POTASSIUM SERPL-SCNC: 5.3 MMOL/L (ref 3.5–5.3)
PROT SERPL-MCNC: 7.6 G/DL (ref 6.1–8.1)
PSA SERPL-MCNC: 1.21 NG/ML
RBC # BLD AUTO: 5.11 MILLION/UL (ref 4.2–5.8)
SODIUM SERPL-SCNC: 136 MMOL/L (ref 135–146)
TRIGL SERPL-MCNC: 252 MG/DL
WBC # BLD AUTO: 6.3 THOUSAND/UL (ref 3.8–10.8)

## 2025-04-29 ENCOUNTER — APPOINTMENT (OUTPATIENT)
Dept: PRIMARY CARE | Facility: CLINIC | Age: 60
End: 2025-04-29
Payer: COMMERCIAL

## 2025-04-29 ENCOUNTER — TELEPHONE (OUTPATIENT)
Dept: PRIMARY CARE | Facility: CLINIC | Age: 60
End: 2025-04-29

## 2025-04-29 VITALS
HEART RATE: 101 BPM | TEMPERATURE: 98 F | WEIGHT: 179 LBS | DIASTOLIC BLOOD PRESSURE: 88 MMHG | SYSTOLIC BLOOD PRESSURE: 158 MMHG | BODY MASS INDEX: 28.04 KG/M2 | OXYGEN SATURATION: 97 %

## 2025-04-29 DIAGNOSIS — I10 PRIMARY HYPERTENSION: ICD-10-CM

## 2025-04-29 DIAGNOSIS — E11.65 TYPE 2 DIABETES MELLITUS WITH HYPERGLYCEMIA, WITHOUT LONG-TERM CURRENT USE OF INSULIN: ICD-10-CM

## 2025-04-29 DIAGNOSIS — E78.2 MIXED HYPERLIPIDEMIA: ICD-10-CM

## 2025-04-29 DIAGNOSIS — M45.9 ANKYLOSING SPONDYLITIS, UNSPECIFIED SITE OF SPINE (MULTI): Primary | ICD-10-CM

## 2025-04-29 PROBLEM — E11.9 DIABETES MELLITUS, TYPE 2 (MULTI): Status: ACTIVE | Noted: 2025-04-29

## 2025-04-29 PROBLEM — E78.5 HYPERLIPEMIA: Status: ACTIVE | Noted: 2025-04-29

## 2025-04-29 PROBLEM — L03.90 CELLULITIS: Status: ACTIVE | Noted: 2025-04-29

## 2025-04-29 PROBLEM — G56.03 CARPAL TUNNEL SYNDROME, BILATERAL: Status: ACTIVE | Noted: 2021-02-17

## 2025-04-29 PROBLEM — Z86.39 HISTORY OF TYPE 2 DIABETES MELLITUS: Status: ACTIVE | Noted: 2025-04-29

## 2025-04-29 PROCEDURE — 99214 OFFICE O/P EST MOD 30 MIN: CPT | Performed by: STUDENT IN AN ORGANIZED HEALTH CARE EDUCATION/TRAINING PROGRAM

## 2025-04-29 PROCEDURE — 3077F SYST BP >= 140 MM HG: CPT | Performed by: STUDENT IN AN ORGANIZED HEALTH CARE EDUCATION/TRAINING PROGRAM

## 2025-04-29 PROCEDURE — 3079F DIAST BP 80-89 MM HG: CPT | Performed by: STUDENT IN AN ORGANIZED HEALTH CARE EDUCATION/TRAINING PROGRAM

## 2025-04-29 PROCEDURE — 4010F ACE/ARB THERAPY RXD/TAKEN: CPT | Performed by: STUDENT IN AN ORGANIZED HEALTH CARE EDUCATION/TRAINING PROGRAM

## 2025-04-29 RX ORDER — ROSUVASTATIN CALCIUM 10 MG/1
10 TABLET, COATED ORAL DAILY
Qty: 100 TABLET | Refills: 3 | Status: SHIPPED | OUTPATIENT
Start: 2025-04-29 | End: 2025-04-29 | Stop reason: SDUPTHER

## 2025-04-29 RX ORDER — ROSUVASTATIN CALCIUM 10 MG/1
10 TABLET, COATED ORAL DAILY
Qty: 30 TABLET | Refills: 3 | Status: SHIPPED | OUTPATIENT
Start: 2025-04-29 | End: 2025-08-27

## 2025-04-29 RX ORDER — TELMISARTAN 20 MG/1
20 TABLET ORAL DAILY
Qty: 30 TABLET | Refills: 11 | Status: SHIPPED | OUTPATIENT
Start: 2025-04-29 | End: 2026-04-29

## 2025-04-29 ASSESSMENT — ENCOUNTER SYMPTOMS
NERVOUS/ANXIOUS: 0
CONSTIPATION: 0
POLYDIPSIA: 0
DIARRHEA: 0
NAUSEA: 0
SINUS PAIN: 0
DIZZINESS: 0
VOMITING: 0
DYSURIA: 0
DYSPHORIC MOOD: 0
SINUS PRESSURE: 0
FREQUENCY: 0
FATIGUE: 0
PALPITATIONS: 0
RHINORRHEA: 0
FEVER: 0
CHILLS: 0
SHORTNESS OF BREATH: 0
MYALGIAS: 0
WOUND: 0
HEADACHES: 0
ARTHRALGIAS: 0
LIGHT-HEADEDNESS: 0
SLEEP DISTURBANCE: 0
COUGH: 0
WHEEZING: 0
BACK PAIN: 1

## 2025-04-29 ASSESSMENT — PAIN SCALES - GENERAL: PAINLEVEL_OUTOF10: 0-NO PAIN

## 2025-04-29 NOTE — PROGRESS NOTES
Subjective   Patient ID: Js Kenny Sr. is a 60 y.o. male who presents for Diabetes and Hypertension.    Patient is here today to follow-up regarding multiple medical problems.  Patient did have labs drawn since last office visit.  He does have known medical history of type 2 diabetes, has only ever been on metformin which he did not tolerate and prior PCP refused to try alternate medications.        Review of Systems   Constitutional:  Negative for chills, fatigue and fever.   HENT:  Negative for congestion, hearing loss, rhinorrhea, sinus pressure, sinus pain and tinnitus.    Eyes:  Negative for visual disturbance.   Respiratory:  Negative for cough, shortness of breath and wheezing.    Cardiovascular:  Negative for chest pain, palpitations and leg swelling.   Gastrointestinal:  Negative for constipation, diarrhea, nausea and vomiting.   Endocrine: Negative for cold intolerance, heat intolerance, polydipsia and polyuria.   Genitourinary:  Negative for dysuria, frequency and urgency.   Musculoskeletal:  Positive for back pain. Negative for arthralgias and myalgias.   Skin:  Negative for pallor, rash and wound.   Neurological:  Negative for dizziness, light-headedness and headaches.   Psychiatric/Behavioral:  Negative for dysphoric mood and sleep disturbance. The patient is not nervous/anxious.        Objective     Visit Vitals  /88   Pulse 101   Temp 36.7 °C (98 °F)   Wt 81.2 kg (179 lb)   SpO2 97%   BMI 28.04 kg/m²   Smoking Status Every Day   BSA 1.96 m²         Physical Exam  Constitutional:       Appearance: Normal appearance.   HENT:      Head: Normocephalic and atraumatic.      Right Ear: External ear normal.      Left Ear: External ear normal.   Eyes:      Conjunctiva/sclera: Conjunctivae normal.   Cardiovascular:      Rate and Rhythm: Normal rate and regular rhythm.      Pulses: Normal pulses.      Heart sounds: Normal heart sounds.   Pulmonary:      Effort: Pulmonary effort is normal.       Breath sounds: Normal breath sounds.   Abdominal:      General: There is no distension.      Palpations: Abdomen is soft.      Tenderness: There is no abdominal tenderness.   Skin:     General: Skin is warm and dry.   Neurological:      Mental Status: He is alert and oriented to person, place, and time.   Psychiatric:         Mood and Affect: Mood normal.         Behavior: Behavior normal.         Assessment & Plan  Ankylosing spondylitis, unspecified site of spine (Multi)    Orders:    Referral to Rheumatology; Future  Reviewed guidelines and most appropriate medications for management of this condition are Biologics that require monitoring by someone well versed in their use.  As such, patient will be referred to rheumatology for further treatment.  Given current uncontrolled blood glucose, it would not be reasonable to start steroids for acute treatment.  Type 2 diabetes mellitus with hyperglycemia, without long-term current use of insulin    Orders:    semaglutide 0.25 mg or 0.5 mg (2 mg/3 mL) pen injector; Inject 0.25 mg under the skin 1 (one) time per week for 28 days, THEN 0.5 mg 1 (one) time per week for 14 days.    Referral to Clinical Pharmacy; Future  Diabetes is not controlled at this time.  Patient is amenable to medication as long as that medication is not metformin.  Will try to start semaglutide as this has beneficial cardiometabolic effects.  If this is not appropriately covered by insurance, patient is to contact office and we will look for other options.  Will need to increase dose.  Patient was counseled regarding typical side effects.  I did discuss with patient regarding contraindications, and there is no family history of thyroid cancer, no family history of M EN syndrome, and no personal history of pancreatitis.  Mixed hyperlipidemia    Orders:    rosuvastatin (Crestor) 10 mg tablet; Take 1 tablet (10 mg) by mouth once daily.  Not treated at this time.  I did explain the dangerous interaction  between dyslipidemia and diabetes.  Patient amenable to starting medication.  Will start rosuvastatin.  Will ultimately need to increase to at least 20 mg if not 40 mg if he tolerates starting dose.  Primary hypertension    Orders:    telmisartan (Micardis) 20 mg tablet; Take 1 tablet (20 mg) by mouth once daily.  Uncontrolled.  Will start treatment with telmisartan.  Plan to increase until blood pressure either controlled or max dose.  I suspect there is a component of chronic pain playing into the current blood pressure elevation and this may be improved by treatment with rheumatology.     To follow-up with clinical pharmacist at earliest convenience.  He is to follow-up with me in 12 weeks  Reviewed and approved by RONALD MARISCAL on 4/29/25 at 8:21 PM.

## 2025-04-29 NOTE — ASSESSMENT & PLAN NOTE
Orders:    telmisartan (Micardis) 20 mg tablet; Take 1 tablet (20 mg) by mouth once daily.  Uncontrolled.  Will start treatment with telmisartan.  Plan to increase until blood pressure either controlled or max dose.  I suspect there is a component of chronic pain playing into the current blood pressure elevation and this may be improved by treatment with rheumatology.

## 2025-04-29 NOTE — ASSESSMENT & PLAN NOTE
Orders:    rosuvastatin (Crestor) 10 mg tablet; Take 1 tablet (10 mg) by mouth once daily.  Not treated at this time.  I did explain the dangerous interaction between dyslipidemia and diabetes.  Patient amenable to starting medication.  Will start rosuvastatin.  Will ultimately need to increase to at least 20 mg if not 40 mg if he tolerates starting dose.

## 2025-04-29 NOTE — ASSESSMENT & PLAN NOTE
Orders:    Referral to Rheumatology; Future  Reviewed guidelines and most appropriate medications for management of this condition are Biologics that require monitoring by someone well versed in their use.  As such, patient will be referred to rheumatology for further treatment.  Given current uncontrolled blood glucose, it would not be reasonable to start steroids for acute treatment.

## 2025-04-29 NOTE — ASSESSMENT & PLAN NOTE
Orders:    semaglutide 0.25 mg or 0.5 mg (2 mg/3 mL) pen injector; Inject 0.25 mg under the skin 1 (one) time per week for 28 days, THEN 0.5 mg 1 (one) time per week for 14 days.    Referral to Clinical Pharmacy; Future  Diabetes is not controlled at this time.  Patient is amenable to medication as long as that medication is not metformin.  Will try to start semaglutide as this has beneficial cardiometabolic effects.  If this is not appropriately covered by insurance, patient is to contact office and we will look for other options.  Will need to increase dose.  Patient was counseled regarding typical side effects.  I did discuss with patient regarding contraindications, and there is no family history of thyroid cancer, no family history of M EN syndrome, and no personal history of pancreatitis.

## 2025-05-05 ENCOUNTER — TELEPHONE (OUTPATIENT)
Dept: PRIMARY CARE | Facility: CLINIC | Age: 60
End: 2025-05-05
Payer: COMMERCIAL

## 2025-05-19 ENCOUNTER — APPOINTMENT (OUTPATIENT)
Dept: PRIMARY CARE | Facility: CLINIC | Age: 60
End: 2025-05-19
Payer: COMMERCIAL

## 2025-06-24 ENCOUNTER — TELEPHONE (OUTPATIENT)
Dept: PRIMARY CARE | Facility: CLINIC | Age: 60
End: 2025-06-24
Payer: COMMERCIAL

## 2025-06-24 ENCOUNTER — PATIENT MESSAGE (OUTPATIENT)
Dept: PRIMARY CARE | Facility: CLINIC | Age: 60
End: 2025-06-24
Payer: COMMERCIAL

## 2025-06-24 DIAGNOSIS — E11.65 TYPE 2 DIABETES MELLITUS WITH HYPERGLYCEMIA, WITHOUT LONG-TERM CURRENT USE OF INSULIN: ICD-10-CM

## 2025-06-24 RX ORDER — SEMAGLUTIDE 0.68 MG/ML
0.5 INJECTION, SOLUTION SUBCUTANEOUS WEEKLY
Qty: 3 ML | Refills: 0 | OUTPATIENT
Start: 2025-06-24

## 2025-06-24 RX ORDER — SEMAGLUTIDE 0.68 MG/ML
0.5 INJECTION, SOLUTION SUBCUTANEOUS WEEKLY
Qty: 3 ML | Refills: 0 | Status: SHIPPED | OUTPATIENT
Start: 2025-06-24 | End: 2025-06-24 | Stop reason: SDUPTHER

## 2025-06-24 RX ORDER — SEMAGLUTIDE 0.68 MG/ML
0.5 INJECTION, SOLUTION SUBCUTANEOUS WEEKLY
Qty: 3 ML | Refills: 1 | Status: SHIPPED | OUTPATIENT
Start: 2025-06-24

## 2025-06-24 NOTE — TELEPHONE ENCOUNTER
Patient called on RX line to request a refill of Semaglutide 0.25 mg be forwarded to [facility] on [address].  His last appointment was on 4/29/2025 and he has an upcoming appointment on 7/25/2025.  Please advise.    Patient ph# [phone]

## 2025-06-25 DIAGNOSIS — I10 PRIMARY HYPERTENSION: ICD-10-CM

## 2025-06-25 DIAGNOSIS — E11.65 TYPE 2 DIABETES MELLITUS WITH HYPERGLYCEMIA, WITHOUT LONG-TERM CURRENT USE OF INSULIN: ICD-10-CM

## 2025-06-25 DIAGNOSIS — E78.2 MIXED HYPERLIPIDEMIA: ICD-10-CM

## 2025-07-18 LAB
ALBUMIN SERPL-MCNC: 4.8 G/DL (ref 3.6–5.1)
ALP SERPL-CCNC: 37 U/L (ref 35–144)
ALT SERPL-CCNC: 27 U/L (ref 9–46)
ANION GAP SERPL CALCULATED.4IONS-SCNC: 8 MMOL/L (CALC) (ref 7–17)
AST SERPL-CCNC: 24 U/L (ref 10–35)
BILIRUB SERPL-MCNC: 0.5 MG/DL (ref 0.2–1.2)
BUN SERPL-MCNC: 19 MG/DL (ref 7–25)
CALCIUM SERPL-MCNC: 9.7 MG/DL (ref 8.6–10.3)
CHLORIDE SERPL-SCNC: 101 MMOL/L (ref 98–110)
CHOLEST SERPL-MCNC: 178 MG/DL
CHOLEST/HDLC SERPL: 2.5 (CALC)
CO2 SERPL-SCNC: 27 MMOL/L (ref 20–32)
CREAT SERPL-MCNC: 0.86 MG/DL (ref 0.7–1.3)
EGFRCR SERPLBLD CKD-EPI 2021: 100 ML/MIN/1.73M2
EST. AVERAGE GLUCOSE BLD GHB EST-MCNC: 163 MG/DL
EST. AVERAGE GLUCOSE BLD GHB EST-SCNC: 9 MMOL/L
GLUCOSE SERPL-MCNC: 162 MG/DL (ref 65–99)
HBA1C MFR BLD: 7.3 %
HDLC SERPL-MCNC: 72 MG/DL
LDLC SERPL CALC-MCNC: 91 MG/DL (CALC)
NONHDLC SERPL-MCNC: 106 MG/DL (CALC)
POTASSIUM SERPL-SCNC: 4.8 MMOL/L (ref 3.5–5.3)
PROT SERPL-MCNC: 7 G/DL (ref 6.1–8.1)
SODIUM SERPL-SCNC: 136 MMOL/L (ref 135–146)
TRIGL SERPL-MCNC: 67 MG/DL

## 2025-07-25 ENCOUNTER — APPOINTMENT (OUTPATIENT)
Dept: PRIMARY CARE | Facility: CLINIC | Age: 60
End: 2025-07-25
Payer: COMMERCIAL

## 2025-07-25 ENCOUNTER — TELEPHONE (OUTPATIENT)
Dept: PRIMARY CARE | Facility: CLINIC | Age: 60
End: 2025-07-25

## 2025-07-25 VITALS
OXYGEN SATURATION: 98 % | DIASTOLIC BLOOD PRESSURE: 60 MMHG | BODY MASS INDEX: 26.68 KG/M2 | HEIGHT: 67 IN | HEART RATE: 74 BPM | SYSTOLIC BLOOD PRESSURE: 110 MMHG | WEIGHT: 170 LBS

## 2025-07-25 DIAGNOSIS — E78.2 MIXED HYPERLIPIDEMIA: ICD-10-CM

## 2025-07-25 DIAGNOSIS — E11.65 TYPE 2 DIABETES MELLITUS WITH HYPERGLYCEMIA, WITHOUT LONG-TERM CURRENT USE OF INSULIN: ICD-10-CM

## 2025-07-25 DIAGNOSIS — M45.9 ANKYLOSING SPONDYLITIS, UNSPECIFIED SITE OF SPINE (MULTI): Primary | ICD-10-CM

## 2025-07-25 DIAGNOSIS — I10 PRIMARY HYPERTENSION: ICD-10-CM

## 2025-07-25 PROBLEM — M75.110 PARTIAL THICKNESS ROTATOR CUFF TEAR: Status: RESOLVED | Noted: 2025-07-25 | Resolved: 2025-07-25

## 2025-07-25 PROBLEM — M75.120 FULL THICKNESS ROTATOR CUFF TEAR: Status: RESOLVED | Noted: 2025-07-25 | Resolved: 2025-07-25

## 2025-07-25 PROCEDURE — 3008F BODY MASS INDEX DOCD: CPT | Performed by: STUDENT IN AN ORGANIZED HEALTH CARE EDUCATION/TRAINING PROGRAM

## 2025-07-25 PROCEDURE — 3078F DIAST BP <80 MM HG: CPT | Performed by: STUDENT IN AN ORGANIZED HEALTH CARE EDUCATION/TRAINING PROGRAM

## 2025-07-25 PROCEDURE — 4010F ACE/ARB THERAPY RXD/TAKEN: CPT | Performed by: STUDENT IN AN ORGANIZED HEALTH CARE EDUCATION/TRAINING PROGRAM

## 2025-07-25 PROCEDURE — 3074F SYST BP LT 130 MM HG: CPT | Performed by: STUDENT IN AN ORGANIZED HEALTH CARE EDUCATION/TRAINING PROGRAM

## 2025-07-25 PROCEDURE — 99214 OFFICE O/P EST MOD 30 MIN: CPT | Performed by: STUDENT IN AN ORGANIZED HEALTH CARE EDUCATION/TRAINING PROGRAM

## 2025-07-25 RX ORDER — ROSUVASTATIN CALCIUM 20 MG/1
20 TABLET, COATED ORAL DAILY
Qty: 90 TABLET | Refills: 1 | Status: SHIPPED | OUTPATIENT
Start: 2025-07-25 | End: 2026-01-21

## 2025-07-25 RX ORDER — PREDNISONE 10 MG/1
TABLET ORAL
Qty: 30 TABLET | Refills: 0 | Status: SHIPPED | OUTPATIENT
Start: 2025-07-25 | End: 2025-08-04

## 2025-07-25 RX ORDER — TELMISARTAN 20 MG/1
20 TABLET ORAL DAILY
Qty: 90 TABLET | Refills: 1 | Status: SHIPPED | OUTPATIENT
Start: 2025-07-25 | End: 2026-01-21

## 2025-07-25 ASSESSMENT — ENCOUNTER SYMPTOMS
VOMITING: 0
CONSTIPATION: 0
PALPITATIONS: 0
NERVOUS/ANXIOUS: 0
BACK PAIN: 1
DYSPHORIC MOOD: 0
WHEEZING: 0
SHORTNESS OF BREATH: 0
COUGH: 0
FATIGUE: 0
NAUSEA: 0
DIARRHEA: 0

## 2025-07-25 ASSESSMENT — PATIENT HEALTH QUESTIONNAIRE - PHQ9
1. LITTLE INTEREST OR PLEASURE IN DOING THINGS: NOT AT ALL
2. FEELING DOWN, DEPRESSED OR HOPELESS: NOT AT ALL
SUM OF ALL RESPONSES TO PHQ9 QUESTIONS 1 AND 2: 0

## 2025-07-25 ASSESSMENT — PAIN SCALES - GENERAL: PAINLEVEL_OUTOF10: 5

## 2025-07-25 ASSESSMENT — COLUMBIA-SUICIDE SEVERITY RATING SCALE - C-SSRS: 1. IN THE PAST MONTH, HAVE YOU WISHED YOU WERE DEAD OR WISHED YOU COULD GO TO SLEEP AND NOT WAKE UP?: NO

## 2025-07-25 NOTE — ASSESSMENT & PLAN NOTE
Cholesterol showing LDL still in the 90s.  Goal would be 70 or less for someone with diabetes.  Would increase to 20 mg, but this will almost assuredly get him to goal so close follow-up is not needed unless he has side effects.  Orders:    rosuvastatin (Crestor) 20 mg tablet; Take 1 tablet (20 mg) by mouth once daily.

## 2025-07-25 NOTE — ASSESSMENT & PLAN NOTE
With marked improvement in A1c, it is now feasible to use steroids to help with his current flare.  Patient states he will work on getting established with a rheumatologist.  Orders:    predniSONE (Deltasone) 10 mg tablet; Take 5 tablets (50 mg) by mouth once daily for 2 days, THEN 4 tablets (40 mg) once daily for 2 days, THEN 3 tablets (30 mg) once daily for 2 days, THEN 2 tablets (20 mg) once daily for 2 days, THEN 1 tablet (10 mg) once daily for 2 days.

## 2025-07-25 NOTE — ASSESSMENT & PLAN NOTE
Blood pressure is well-controlled at this time.  No need to increase dose here.  Orders:    telmisartan (Micardis) 20 mg tablet; Take 1 tablet (20 mg) by mouth once daily.

## 2025-07-25 NOTE — ASSESSMENT & PLAN NOTE
A1c is near goal but not quite at goal.  Will increase dose to 1 mg.  This will likely get him to goal.  Orders:    semaglutide (OZEMPIC) 1 mg/dose (4 mg/3 mL) pen injector; Inject 1 mg under the skin 1 (one) time per week.

## 2025-07-25 NOTE — PROGRESS NOTES
"Subjective   Patient ID: Js Kenny . \"Braxton\" is a 60 y.o. male who presents for Follow-up (Patient is here for a follow-up on labs /Will do lunemtic at next visit ).    Today to follow-up regarding multiple medical problems.    Has been taking Ozempic weekly since last visit.  He did have some decreased appetite.  He has lost a little bit of weight.  States that it has gotten more normal since he has gotten used to the medication and has not been excessively decreased.    Has not noticed any side effects from Crestor.  Has not noticed any side effects from telmisartan.        Review of Systems   Constitutional:  Negative for fatigue.   Respiratory:  Negative for cough, shortness of breath and wheezing.    Cardiovascular:  Negative for chest pain, palpitations and leg swelling.   Gastrointestinal:  Negative for constipation, diarrhea, nausea and vomiting.   Musculoskeletal:  Positive for back pain.   Psychiatric/Behavioral:  Negative for dysphoric mood. The patient is not nervous/anxious.        Objective     Visit Vitals  /60   Pulse 74   Ht 1.702 m (5' 7\")   Wt 77.1 kg (170 lb)   SpO2 98%   BMI 26.63 kg/m²   Smoking Status Every Day   BSA 1.91 m²      Wt Readings from Last 5 Encounters:   07/25/25 77.1 kg (170 lb)   04/29/25 81.2 kg (179 lb)   04/11/25 84.4 kg (186 lb)   01/28/25 85.9 kg (189 lb 6 oz)   01/28/25 83.5 kg (184 lb)        Physical Exam  Constitutional:       Appearance: Normal appearance.   HENT:      Head: Normocephalic and atraumatic.     Cardiovascular:      Rate and Rhythm: Normal rate and regular rhythm.      Heart sounds: Normal heart sounds.   Pulmonary:      Effort: Pulmonary effort is normal.      Breath sounds: Normal breath sounds.     Skin:     General: Skin is warm and dry.     Neurological:      Mental Status: He is alert.     Psychiatric:         Mood and Affect: Mood normal.         Behavior: Behavior normal.         Assessment & Plan  Mixed hyperlipidemia  Cholesterol " showing LDL still in the 90s.  Goal would be 70 or less for someone with diabetes.  Would increase to 20 mg, but this will almost assuredly get him to goal so close follow-up is not needed unless he has side effects.  Orders:    rosuvastatin (Crestor) 20 mg tablet; Take 1 tablet (20 mg) by mouth once daily.    Primary hypertension  Blood pressure is well-controlled at this time.  No need to increase dose here.  Orders:    telmisartan (Micardis) 20 mg tablet; Take 1 tablet (20 mg) by mouth once daily.    Type 2 diabetes mellitus with hyperglycemia, without long-term current use of insulin  A1c is near goal but not quite at goal.  Will increase dose to 1 mg.  This will likely get him to goal.  Orders:    semaglutide (OZEMPIC) 1 mg/dose (4 mg/3 mL) pen injector; Inject 1 mg under the skin 1 (one) time per week.    Ankylosing spondylitis, unspecified site of spine (Multi)  With marked improvement in A1c, it is now feasible to use steroids to help with his current flare.  Patient states he will work on getting established with a rheumatologist.  Orders:    predniSONE (Deltasone) 10 mg tablet; Take 5 tablets (50 mg) by mouth once daily for 2 days, THEN 4 tablets (40 mg) once daily for 2 days, THEN 3 tablets (30 mg) once daily for 2 days, THEN 2 tablets (20 mg) once daily for 2 days, THEN 1 tablet (10 mg) once daily for 2 days.       He is to follow-up in 6 months, sooner if needed.  Reviewed and approved by RONALD MARISCAL on 7/25/25 at 2:49 PM.

## 2025-07-26 ENCOUNTER — OFFICE VISIT (OUTPATIENT)
Dept: URGENT CARE | Age: 60
End: 2025-07-26
Payer: COMMERCIAL

## 2025-07-26 VITALS
RESPIRATION RATE: 16 BRPM | BODY MASS INDEX: 26.06 KG/M2 | SYSTOLIC BLOOD PRESSURE: 150 MMHG | WEIGHT: 166 LBS | HEART RATE: 84 BPM | TEMPERATURE: 98.7 F | HEIGHT: 67 IN | OXYGEN SATURATION: 97 % | DIASTOLIC BLOOD PRESSURE: 90 MMHG

## 2025-07-26 DIAGNOSIS — T23.261A PARTIAL THICKNESS BURN OF BACK OF RIGHT HAND, INITIAL ENCOUNTER: Primary | ICD-10-CM

## 2025-07-26 PROCEDURE — 3077F SYST BP >= 140 MM HG: CPT | Performed by: NURSE PRACTITIONER

## 2025-07-26 PROCEDURE — 3008F BODY MASS INDEX DOCD: CPT | Performed by: NURSE PRACTITIONER

## 2025-07-26 PROCEDURE — 99213 OFFICE O/P EST LOW 20 MIN: CPT | Performed by: NURSE PRACTITIONER

## 2025-07-26 PROCEDURE — 4010F ACE/ARB THERAPY RXD/TAKEN: CPT | Performed by: NURSE PRACTITIONER

## 2025-07-26 PROCEDURE — 3080F DIAST BP >= 90 MM HG: CPT | Performed by: NURSE PRACTITIONER

## 2025-07-26 ASSESSMENT — ENCOUNTER SYMPTOMS: WOUND: 1

## 2025-07-26 ASSESSMENT — PAIN SCALES - GENERAL: PAINLEVEL_OUTOF10: 1
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
## 2025-07-30 ENCOUNTER — OFFICE VISIT (OUTPATIENT)
Dept: PRIMARY CARE | Facility: CLINIC | Age: 60
End: 2025-07-30
Payer: COMMERCIAL

## 2025-07-30 VITALS
BODY MASS INDEX: 26.68 KG/M2 | HEART RATE: 102 BPM | DIASTOLIC BLOOD PRESSURE: 87 MMHG | SYSTOLIC BLOOD PRESSURE: 145 MMHG | OXYGEN SATURATION: 95 % | HEIGHT: 67 IN | WEIGHT: 170 LBS | TEMPERATURE: 98.1 F

## 2025-07-30 DIAGNOSIS — T24.102A SUPERFICIAL BURN OF LEFT LOWER EXTREMITY, INITIAL ENCOUNTER: ICD-10-CM

## 2025-07-30 DIAGNOSIS — T23.261D PARTIAL THICKNESS BURN OF BACK OF RIGHT HAND, SUBSEQUENT ENCOUNTER: Primary | ICD-10-CM

## 2025-07-30 PROCEDURE — 3079F DIAST BP 80-89 MM HG: CPT | Performed by: FAMILY MEDICINE

## 2025-07-30 PROCEDURE — 3008F BODY MASS INDEX DOCD: CPT | Performed by: FAMILY MEDICINE

## 2025-07-30 PROCEDURE — 4010F ACE/ARB THERAPY RXD/TAKEN: CPT | Performed by: FAMILY MEDICINE

## 2025-07-30 PROCEDURE — 3077F SYST BP >= 140 MM HG: CPT | Performed by: FAMILY MEDICINE

## 2025-07-30 PROCEDURE — 99212 OFFICE O/P EST SF 10 MIN: CPT | Performed by: FAMILY MEDICINE

## 2025-07-30 ASSESSMENT — ENCOUNTER SYMPTOMS: WOUND: 1
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
## 2025-08-06 ENCOUNTER — PATIENT MESSAGE (OUTPATIENT)
Dept: PRIMARY CARE | Facility: CLINIC | Age: 60
End: 2025-08-06
Payer: COMMERCIAL

## 2025-08-06 DIAGNOSIS — F17.200 TOBACCO USE DISORDER: Primary | ICD-10-CM

## 2025-08-14 RX ORDER — VARENICLINE TARTRATE 0.5 (11)-1
KIT ORAL
Qty: 1 EACH | Refills: 0 | Status: SHIPPED | OUTPATIENT
Start: 2025-08-14

## 2026-01-30 ENCOUNTER — APPOINTMENT (OUTPATIENT)
Dept: PRIMARY CARE | Facility: CLINIC | Age: 61
End: 2026-01-30
Payer: COMMERCIAL